# Patient Record
Sex: FEMALE | Race: WHITE | NOT HISPANIC OR LATINO | Employment: FULL TIME | URBAN - METROPOLITAN AREA
[De-identification: names, ages, dates, MRNs, and addresses within clinical notes are randomized per-mention and may not be internally consistent; named-entity substitution may affect disease eponyms.]

---

## 2023-05-27 PROBLEM — E55.9 VITAMIN D DEFICIENCY: Status: ACTIVE | Noted: 2023-05-27

## 2023-05-27 PROBLEM — J30.9 ALLERGIC RHINITIS: Status: ACTIVE | Noted: 2023-05-27

## 2023-05-27 PROBLEM — Z00.00 ROUTINE ADULT HEALTH MAINTENANCE: Status: ACTIVE | Noted: 2023-05-27

## 2023-05-27 PROBLEM — N92.6 IRREGULAR MENSTRUAL BLEEDING: Status: ACTIVE | Noted: 2023-05-27

## 2023-06-19 ENCOUNTER — OFFICE VISIT (OUTPATIENT)
Dept: PRIMARY CARE | Facility: CLINIC | Age: 18
End: 2023-06-19
Payer: COMMERCIAL

## 2023-06-19 VITALS
BODY MASS INDEX: 20.63 KG/M2 | HEART RATE: 93 BPM | WEIGHT: 128.38 LBS | HEIGHT: 66 IN | SYSTOLIC BLOOD PRESSURE: 122 MMHG | OXYGEN SATURATION: 98 % | TEMPERATURE: 97.3 F | DIASTOLIC BLOOD PRESSURE: 77 MMHG

## 2023-06-19 DIAGNOSIS — Z00.00 ROUTINE ADULT HEALTH MAINTENANCE: Primary | ICD-10-CM

## 2023-06-19 DIAGNOSIS — N92.6 IRREGULAR MENSTRUAL BLEEDING: ICD-10-CM

## 2023-06-19 PROCEDURE — 99395 PREV VISIT EST AGE 18-39: CPT | Performed by: INTERNAL MEDICINE

## 2023-06-19 PROCEDURE — 1036F TOBACCO NON-USER: CPT | Performed by: INTERNAL MEDICINE

## 2023-06-19 RX ORDER — NORETHINDRONE ACETATE AND ETHINYL ESTRADIOL 1MG-20(21)
1 KIT ORAL DAILY
COMMUNITY
End: 2023-06-19 | Stop reason: SDUPTHER

## 2023-06-19 RX ORDER — NORETHINDRONE ACETATE AND ETHINYL ESTRADIOL 1MG-20(21)
1 KIT ORAL DAILY
Qty: 90 TABLET | Refills: 3 | Status: SHIPPED | OUTPATIENT
Start: 2023-06-19 | End: 2024-05-06 | Stop reason: SDUPTHER

## 2023-06-19 ASSESSMENT — PATIENT HEALTH QUESTIONNAIRE - PHQ9
SUM OF ALL RESPONSES TO PHQ9 QUESTIONS 1 AND 2: 0
1. LITTLE INTEREST OR PLEASURE IN DOING THINGS: NOT AT ALL
2. FEELING DOWN, DEPRESSED OR HOPELESS: NOT AT ALL

## 2023-06-19 NOTE — PROGRESS NOTES
Reason for Visit: Annual Physical Exam    Assessment and Plan:  Problem List Items Addressed This Visit          High    Routine adult health maintenance - Primary    Overview     Td 3/1/22  [employer] COVID-19 Aug 10 2021 12:00AM    Aug 31 2021 12:00AM     HPV (3)  Jul 13 2015 12:00AM Sep 19 2015 12:00AM Jul 28 2016 12:00AM   DTAP (1)  Jan 21 2005 12:00AM       DTP/DTaP (5) 7/529268, 2003,Dec  1 2003 12:00AMJul 23 2008 12:00AM Jul  3 2014   Hepatitis A (2)  Aug  6 2016 12:00AM Feb 6 2017 12:00AM      Hepatitis B (3)  Jul 28 2003 12:00AM Sep 30 2003 12:00AM Dec  1 2003 12:00AM    HIB (4) Jul 28 2003 12:00AM Sep 30 2003 12:00AM Dec  1 2003 12:00AM Sep  3 2004  Influenza (1)  Feb 6 2017 12:00AM       Meningococcal (2)  Jul  3 2014 12:00AM Aug  2 2019 12:00AM      MMR (2)  May 28 2004 12:00AM Jul 23 2008 12:00AM      PCV (3)  Jul 28 2003 12:00AM Sep 30 2003 12:00AM May 27 2005 12:00AM    Polio (4) Jul 28 2003 12:00AM Sep 30 2003 12:00AM Dec  1 2003 12:00AM Jul 23 2008    Varicella (2)  Jan 21 2005         Current Assessment & Plan     Annual Wellness exam completed   Preventive Health history reviewed:  Vaccines today: none  Labs ordered    Depression Screening done         Relevant Medications    norethindrone-e.estradioL-iron (Junel FE 1/20) 1 mg-20 mcg (21)/75 mg (7) tablet    Other Relevant Orders    Urinalysis with Reflex Microscopic    Comprehensive Metabolic Panel    CBC and Auto Differential    Lipid Panel       Medium    Irregular menstrual bleeding    Overview     Comment on above: on OCP;         Relevant Medications    norethindrone-e.estradioL-iron (Junel FE 1/20) 1 mg-20 mcg (21)/75 mg (7) tablet         HPI: Feels well  Sometimes doesn't have a period on her OCP  Works as  for [employer] office at  and [employer]  She is cheerleader at       ROS otherwise negative aside from what was mentioned above in HPI.    Vitals  /77   Pulse 93   Temp 36.3 °C (97.3 °F)   Ht  "1.664 m (5' 5.5\")   Wt 58.2 kg (128 lb 6 oz)   SpO2 98%   BMI 21.04 kg/m²   Body mass index is 21.04 kg/m².  Physical Exam  Gen: Alert, NAD  HEENT:  Normal exam  Neck:  No masses/nodes palpable; Thyroid nontender and without nodules; No MANOJ  Respiratory:  Lungs CTAB  CV: RRR  Neuro:  Gross motor and sensory intact  Skin:  No suspicious lesions present  Breast: No masses or axillary lymphadenopathy      Active Problem List  Patient Active Problem List   Diagnosis    Routine adult health maintenance    Allergic rhinitis    Irregular menstrual bleeding    Vitamin D deficiency       Comprehensive Medical/Surgical/Social/Family History  History reviewed. No pertinent past medical history.  Past Surgical History:   Procedure Laterality Date    OTHER SURGICAL HISTORY  05/28/2022    Tonsillectomy    OTHER SURGICAL HISTORY  06/06/2022    [place] tooth extraction     Social History     Social History Narrative    Single    Student at , Education    Works as  for [employer] at  and [employer]    She is cheerleader at     no kids    Nonsmoker    No ETOH    ---    Family History:    M: HTN    F:     B:     MGF: Pancreatic CA    MGM: CAD/CHF    MUncle: ETOH       Allergies and Medications  Patient has no known allergies.  Current Outpatient Medications   Medication Instructions    norethindrone-e.estradioL-iron (Junel FE 1/20) 1 mg-20 mcg (21)/75 mg (7) tablet 1 tablet, oral, Daily, as directed     "

## 2024-05-06 DIAGNOSIS — N92.6 IRREGULAR MENSTRUAL BLEEDING: ICD-10-CM

## 2024-05-06 DIAGNOSIS — Z00.00 ROUTINE ADULT HEALTH MAINTENANCE: ICD-10-CM

## 2024-05-06 RX ORDER — NORETHINDRONE ACETATE AND ETHINYL ESTRADIOL 1MG-20(21)
1 KIT ORAL DAILY
Qty: 90 TABLET | Refills: 3 | Status: SHIPPED | OUTPATIENT
Start: 2024-05-06 | End: 2025-05-06

## 2024-07-05 ENCOUNTER — APPOINTMENT (OUTPATIENT)
Dept: PRIMARY CARE | Facility: CLINIC | Age: 19
End: 2024-07-05
Payer: COMMERCIAL

## 2024-08-19 ENCOUNTER — APPOINTMENT (OUTPATIENT)
Dept: PRIMARY CARE | Facility: CLINIC | Age: 19
End: 2024-08-19
Payer: COMMERCIAL

## 2024-08-19 VITALS
HEIGHT: 65 IN | HEART RATE: 99 BPM | WEIGHT: 128 LBS | TEMPERATURE: 97.8 F | DIASTOLIC BLOOD PRESSURE: 71 MMHG | SYSTOLIC BLOOD PRESSURE: 106 MMHG | OXYGEN SATURATION: 98 % | BODY MASS INDEX: 21.33 KG/M2

## 2024-08-19 DIAGNOSIS — E55.9 VITAMIN D DEFICIENCY: ICD-10-CM

## 2024-08-19 DIAGNOSIS — Z00.00 ROUTINE ADULT HEALTH MAINTENANCE: Primary | ICD-10-CM

## 2024-08-19 DIAGNOSIS — Z12.4 SCREENING FOR CERVICAL CANCER: ICD-10-CM

## 2024-08-19 DIAGNOSIS — Z11.8 SCREENING FOR CHLAMYDIAL DISEASE: ICD-10-CM

## 2024-08-19 PROCEDURE — 99395 PREV VISIT EST AGE 18-39: CPT | Performed by: INTERNAL MEDICINE

## 2024-08-19 PROCEDURE — 87591 N.GONORRHOEAE DNA AMP PROB: CPT

## 2024-08-19 PROCEDURE — 1036F TOBACCO NON-USER: CPT | Performed by: INTERNAL MEDICINE

## 2024-08-19 PROCEDURE — 87491 CHLMYD TRACH DNA AMP PROBE: CPT

## 2024-08-19 PROCEDURE — 3008F BODY MASS INDEX DOCD: CPT | Performed by: INTERNAL MEDICINE

## 2024-08-19 RX ORDER — PHENAZOPYRIDINE HYDROCHLORIDE 100 MG/1
100 TABLET, FILM COATED ORAL
COMMUNITY
Start: 2024-08-15

## 2024-08-19 RX ORDER — NITROFURANTOIN 25; 75 MG/1; MG/1
100 CAPSULE ORAL EVERY 12 HOURS SCHEDULED
COMMUNITY
Start: 2024-08-15

## 2024-08-19 ASSESSMENT — PROMIS GLOBAL HEALTH SCALE
CARRYOUT_SOCIAL_ACTIVITIES: EXCELLENT
CARRYOUT_PHYSICAL_ACTIVITIES: COMPLETELY
RATE_MENTAL_HEALTH: EXCELLENT
RATE_SOCIAL_SATISFACTION: EXCELLENT
RATE_AVERAGE_PAIN: 0
EMOTIONAL_PROBLEMS: RARELY
RATE_GENERAL_HEALTH: EXCELLENT
RATE_QUALITY_OF_LIFE: EXCELLENT
RATE_PHYSICAL_HEALTH: EXCELLENT

## 2024-08-19 ASSESSMENT — PATIENT HEALTH QUESTIONNAIRE - PHQ9
1. LITTLE INTEREST OR PLEASURE IN DOING THINGS: NOT AT ALL
SUM OF ALL RESPONSES TO PHQ9 QUESTIONS 1 AND 2: 0
2. FEELING DOWN, DEPRESSED OR HOPELESS: NOT AT ALL

## 2024-08-19 NOTE — ASSESSMENT & PLAN NOTE
Annual Wellness exam completed   Preventive Health History reviewed  Ordered:   Labs    PAP done

## 2024-08-19 NOTE — PROGRESS NOTES
"ANNUAL CPE VISIT  Chief Complaint   Patient presents with    Annual Exam     Went to Urgent Care for UTI last week.   Was put on Macrobid and Pyridium  Helped    Assessment and Plan:  Problem List Items Addressed This Visit          High    Routine adult health maintenance - Primary    Overview     Td 3/1/22  [employer] COVID-19 Aug 10 2021 12:00AM    Aug 31 2021 12:00AM     HPV (3)Jul 13 2015 12:00AM Sep 19 2015 12:00AM Jul 28 2016 12:00AM  DTAP (1) Jan 21 2005 12:00AM       DTP/DTaP (5) 7/635014, 2003,Dec  1 2003,Jul 23 2008 12:00AM,Jul  3 2014   Hepatitis A (2) Aug  6 2016 12:00AM Feb 6 2017 12:00AM  Hepatitis B (3) Jul 28 2003 12:00AM Sep 30 2003 12:00AM Dec  1 2003 12:00AM   HIB (4) Jul 28 2003 12:00AM Sep 30 2003 12:00AM Dec  1 2003 12:00AM Sep  3 2004  Influenza (1) Feb 6 2017 12:00AM       Meningococcal (2) ul  3 2014 12:00AM Aug  2 2019 12:00AM    MMR (2) May 28 2004 12:00AM Jul 23 2008 12:00AM    PCV (3)  Jul 28 2003 12:00AM Sep 30 2003 12:00AM May 27 2005 12:00AM  Polio (4) Jul 28 2003 12:00AM Sep 30 2003 12:00AM Dec  1 2003 12:00AM Jul 23 2008  Varicella (2) Jan 21 2005         Current Assessment & Plan     Annual Wellness exam completed   Preventive Health History reviewed  Ordered:   Labs    PAP done           Relevant Orders    TSH with reflex to Free T4 if abnormal    Comprehensive Metabolic Panel    CBC and Auto Differential    Lipid Panel    Urinalysis with Reflex Microscopic       Medium    Screening for cervical cancer    Relevant Orders    THINPREP PAP TEST    Screening for chlamydial disease    Relevant Orders    C. Trachomatis / N. Gonorrhoeae, Amplified Detection    Vitamin D deficiency    Relevant Orders    Vitamin D 25-Hydroxy,Total (for eval of Vitamin D levels)       ROS otherwise negative aside from what was mentioned above in HPI.    Vitals  /71   Pulse 99   Temp 36.6 °C (97.8 °F)   Ht 1.657 m (5' 5.25\")   Wt 58.1 kg (128 lb)   SpO2 98%   BMI 21.14 kg/m²   Body " mass index is 21.14 kg/m².  Physical Exam  Gen: Alert, NAD  HEENT:  Normal exam  Neck:  No masses/nodes palpable; Thyroid nontender and without nodules; No MANOJ  Respiratory:  Lungs CTAB  CV: RRR  Neuro:  Gross motor and sensory intact  Skin:  No suspicious lesions present  Breast: No masses or axillary lymphadenopathy  Gyn: Normal pelvic exam: no uterine masses or cervical lesions, or CMT; no vaginal D/C. No ovarian or adnexal masses; No external vaginal or anal/perineal lesions (Pt declined chaperone)      Allergies and Medications  Patient has no known allergies.  Current Outpatient Medications   Medication Instructions    nitrofurantoin, macrocrystal-monohydrate, (Macrobid) 100 mg capsule 100 mg, oral, Every 12 hours scheduled    norethindrone-e.estradioL-iron (Junel FE 1/20) 1 mg-20 mcg (21)/75 mg (7) tablet 1 tablet, oral, Daily, as directed    phenazopyridine (PYRIDIUM) 100 mg, oral, 3 times daily (morning, midday, late afternoon)       Active Problem List  Patient Active Problem List   Diagnosis    Routine adult health maintenance    Allergic rhinitis    Irregular menstrual bleeding    Vitamin D deficiency    Screening for chlamydial disease    Screening for cervical cancer       Comprehensive Medical/Surgical/Social/Family History  History reviewed. No pertinent past medical history.  Past Surgical History:   Procedure Laterality Date    OTHER SURGICAL HISTORY  05/28/2022    Tonsillectomy    OTHER SURGICAL HISTORY  06/06/2022    [place] tooth extraction       Social History     Social History Narrative    Social History:    Single, no kids    Student at , [employer]    Works as  for [employer] at  and [employer]    She is cheerleader at     Nonsmoker    Social ETOH    ---    Family History:    M: HTN    F:     B:     MGF: Pancreatic CA    MGM: CAD/CHF    MUncle: ETOH

## 2024-08-21 LAB
C TRACH RRNA SPEC QL NAA+PROBE: NEGATIVE
N GONORRHOEA DNA SPEC QL PROBE+SIG AMP: NEGATIVE

## 2024-08-30 LAB
CYTOLOGY CMNT CVX/VAG CYTO-IMP: NORMAL
LAB AP HPV GENOTYPE QUESTION: YES
LAB AP HPV HR: NORMAL
LAB AP PAP ADDITIONAL TESTS: NORMAL
LABORATORY COMMENT REPORT: NORMAL
PATH REPORT.TOTAL CANCER: NORMAL

## 2024-12-02 DIAGNOSIS — N92.6 IRREGULAR MENSTRUAL BLEEDING: ICD-10-CM

## 2024-12-02 DIAGNOSIS — Z00.00 ROUTINE ADULT HEALTH MAINTENANCE: ICD-10-CM

## 2024-12-02 RX ORDER — NORETHINDRONE ACETATE AND ETHINYL ESTRADIOL 1MG-20(21)
1 KIT ORAL DAILY
Qty: 90 TABLET | Refills: 3 | Status: SHIPPED | OUTPATIENT
Start: 2024-12-02 | End: 2025-12-02

## 2025-08-12 ENCOUNTER — APPOINTMENT (OUTPATIENT)
Dept: PRIMARY CARE | Facility: CLINIC | Age: 20
End: 2025-08-12
Payer: COMMERCIAL